# Patient Record
Sex: MALE | Race: WHITE | Employment: OTHER | URBAN - METROPOLITAN AREA
[De-identification: names, ages, dates, MRNs, and addresses within clinical notes are randomized per-mention and may not be internally consistent; named-entity substitution may affect disease eponyms.]

---

## 2021-03-05 ENCOUNTER — IMMUNIZATION (OUTPATIENT)
Dept: PRIMARY CARE CLINIC | Age: 66
End: 2021-03-05
Payer: MEDICARE

## 2021-03-05 PROCEDURE — 0031A COVID-19, J&J VACCINE, PF, 0.5 ML DOSE: CPT | Performed by: FAMILY MEDICINE

## 2021-03-05 PROCEDURE — 91303 COVID-19, J&J VACCINE, PF, 0.5 ML DOSE: CPT | Performed by: FAMILY MEDICINE

## 2022-08-25 ENCOUNTER — OFFICE VISIT (OUTPATIENT)
Dept: SPORTS MEDICINE | Age: 67
End: 2022-08-25
Payer: MEDICARE

## 2022-08-25 VITALS
WEIGHT: 205 LBS | TEMPERATURE: 97.1 F | SYSTOLIC BLOOD PRESSURE: 138 MMHG | BODY MASS INDEX: 29.35 KG/M2 | HEIGHT: 70 IN | DIASTOLIC BLOOD PRESSURE: 82 MMHG

## 2022-08-25 DIAGNOSIS — Z98.890 HX OF LUMBOSACRAL SPINE SURGERY: ICD-10-CM

## 2022-08-25 DIAGNOSIS — M47.26 OSTEOARTHRITIS OF SPINE WITH RADICULOPATHY, LUMBAR REGION: ICD-10-CM

## 2022-08-25 DIAGNOSIS — M51.16 HERNIATION OF LUMBAR INTERVERTEBRAL DISC WITH RADICULOPATHY: Primary | ICD-10-CM

## 2022-08-25 PROCEDURE — G8417 CALC BMI ABV UP PARAM F/U: HCPCS | Performed by: FAMILY MEDICINE

## 2022-08-25 PROCEDURE — 1123F ACP DISCUSS/DSCN MKR DOCD: CPT | Performed by: FAMILY MEDICINE

## 2022-08-25 PROCEDURE — 99204 OFFICE O/P NEW MOD 45 MIN: CPT | Performed by: FAMILY MEDICINE

## 2022-08-25 PROCEDURE — 1036F TOBACCO NON-USER: CPT | Performed by: FAMILY MEDICINE

## 2022-08-25 PROCEDURE — 3017F COLORECTAL CA SCREEN DOC REV: CPT | Performed by: FAMILY MEDICINE

## 2022-08-25 PROCEDURE — G8427 DOCREV CUR MEDS BY ELIG CLIN: HCPCS | Performed by: FAMILY MEDICINE

## 2022-08-25 RX ORDER — MELOXICAM 15 MG/1
15 TABLET ORAL DAILY
Qty: 30 TABLET | Refills: 0 | Status: SHIPPED | OUTPATIENT
Start: 2022-08-25 | End: 2022-09-27 | Stop reason: SDUPTHER

## 2022-08-25 RX ORDER — GABAPENTIN 300 MG/1
300 CAPSULE ORAL NIGHTLY
Qty: 30 CAPSULE | Refills: 0 | Status: SHIPPED | OUTPATIENT
Start: 2022-08-25 | End: 2022-09-24

## 2022-08-25 ASSESSMENT — ENCOUNTER SYMPTOMS
ABDOMINAL DISTENTION: 0
CHEST TIGHTNESS: 0
EYE DISCHARGE: 0
SINUS PAIN: 0
FACIAL SWELLING: 0
APNEA: 0

## 2022-08-25 NOTE — PROGRESS NOTES
[facility]) Physicians  Neurosurgery and Pain [facility]  [address], [address] [facility] , [address] [facility], [facility]: [phone]  F: [phone]      Redd Walsh  (1955)    8/25/2022    Subjective:     Redd Walsh is 78 y.o. male who complains today of:    Chief Complaint   Patient presents with    Lower Back Pain     Previous surgery with Dr. Otis Boo - recommends Lumbar MRI       HPI     This patient comes in complaining of approximately a month of low back pain with left radiculopathy which goes down to the toes he states he has been working with his chiropractic physician during that time and has been having manipulation as well as a physical therapy program with exercises he continues to have issues since really started to bother him at nighttime now and is concerned he states that approximately 10 to 15 years ago he had surgery he believes at the L4-5 level but has not had a problem until recently  Allergies:  Bactrim [sulfamethoxazole-trimethoprim] and Ciprofloxacin    Past Medical History:   Diagnosis Date    Diverticulitis, colon 11/2010    GERD (gastroesophageal reflux disease)     GERD (gastroesophageal reflux disease)     Hyperlipidemia      Past Surgical History:   Procedure Laterality Date    BACK SURGERY  022010    disc surgery lower back    COLECTOMY Left 12/20/2012    probably left, not certain    COLON SURGERY      DENTAL SURGERY      ILEOSTOMY OR JEJUNOSTOMY  1/8/2013     Family History   Problem Relation Age of Onset    Heart Attack Mother     High Cholesterol Mother     Cancer Father         prostate    Cancer Brother         throat     Social History     Socioeconomic History    Marital status:      Spouse name: Not on file    Number of children: Not on file    Years of education: Not on file    Highest education level: Not on file   Occupational History    Not on file   Tobacco Use    Smoking status: Former     Types: Cigars     Start date: 1994 Quit date: 0     Years since quittin.6    Smokeless tobacco: Never   Substance and Sexual Activity    Alcohol use: Yes    Drug use: No    Sexual activity: Yes   Other Topics Concern    Not on file   Social History Narrative    Not on file     Social Determinants of Health     Financial Resource Strain: Not on file   Food Insecurity: Not on file   Transportation Needs: Not on file   Physical Activity: Not on file   Stress: Not on file   Social Connections: Not on file   Intimate Partner Violence: Not on file   Housing Stability: Not on file       Current Outpatient Medications on File Prior to Visit   Medication Sig Dispense Refill    omeprazole (PRILOSEC) 10 MG capsule Take 10 mg by mouth daily. simvastatin (ZOCOR) 20 MG tablet Take 20 mg by mouth nightly. oxyCODONE-acetaminophen (PERCOCET) 5-325 MG per tablet Take 1 tablet by mouth every 4 hours as needed. (Patient not taking: Reported on 2022)       No current facility-administered medications on file prior to visit. Review of Systems   Constitutional:  Negative for activity change and fever. HENT:  Negative for congestion, facial swelling and sinus pain. Eyes:  Negative for discharge. Respiratory:  Negative for apnea and chest tightness. Gastrointestinal:  Negative for abdominal distention. Endocrine: Negative for cold intolerance. Genitourinary:  Negative for difficulty urinating and dysuria. Allergic/Immunologic: Negative for immunocompromised state. Neurological:  Negative for dizziness. Hematological:  Negative for adenopathy. Psychiatric/Behavioral:  Negative for agitation, behavioral problems and confusion. Objective:     Vitals:  /82 (Site: Left Upper Arm, Position: Sitting, Cuff Size: Large Adult)   Temp 97.1 °F (36.2 °C) (Infrared)   Ht 5' 10\" (1.778 m)   Wt 205 lb (93 kg)   BMI 29.41 kg/m² Pain Score:   7      Physical Exam  Constitutional:       Appearance: Normal appearance. XR LUMBAR SPINE (2-3 VIEWS)     Standing Status:   Future     Standing Expiration Date:   8/25/2023    MRI LUMBAR SPINE W WO CONTRAST     Standing Status:   Future     Standing Expiration Date:   11/23/2022     Order Specific Question:   STAT Creatinine as needed:     Answer:   Yes         Follow up:  Return for mri results.     NIRAJ ARIAS DO

## 2022-08-31 ENCOUNTER — HOSPITAL ENCOUNTER (OUTPATIENT)
Dept: MRI IMAGING | Age: 67
Discharge: HOME | End: 2022-09-02
Payer: MEDICARE

## 2022-08-31 DIAGNOSIS — M51.16 HERNIATION OF LUMBAR INTERVERTEBRAL DISC WITH RADICULOPATHY: ICD-10-CM

## 2022-08-31 PROCEDURE — A9579 GAD-BASE MR CONTRAST NOS,1ML: HCPCS | Performed by: FAMILY MEDICINE

## 2022-08-31 PROCEDURE — 72158 MRI LUMBAR SPINE W/O & W/DYE: CPT

## 2022-08-31 PROCEDURE — 6360000004 HC RX CONTRAST MEDICATION: Performed by: FAMILY MEDICINE

## 2022-08-31 RX ORDER — SODIUM CHLORIDE 0.9 % (FLUSH) 0.9 %
10 SYRINGE (ML) INJECTION PRN
Status: DISCONTINUED | OUTPATIENT
Start: 2022-08-31 | End: 2022-09-03 | Stop reason: HOSPADM

## 2022-08-31 RX ADMIN — GADOTERIDOL 20 ML: 279.3 INJECTION, SOLUTION INTRAVENOUS at 11:51

## 2022-09-12 ENCOUNTER — OFFICE VISIT (OUTPATIENT)
Dept: SPORTS MEDICINE | Age: 67
End: 2022-09-12
Payer: MEDICARE

## 2022-09-12 VITALS — TEMPERATURE: 96.1 F | BODY MASS INDEX: 29.35 KG/M2 | WEIGHT: 205 LBS | HEIGHT: 70 IN

## 2022-09-12 DIAGNOSIS — M51.16 HERNIATION OF LUMBAR INTERVERTEBRAL DISC WITH RADICULOPATHY: Primary | ICD-10-CM

## 2022-09-12 PROCEDURE — 1036F TOBACCO NON-USER: CPT | Performed by: FAMILY MEDICINE

## 2022-09-12 PROCEDURE — G8427 DOCREV CUR MEDS BY ELIG CLIN: HCPCS | Performed by: FAMILY MEDICINE

## 2022-09-12 PROCEDURE — G8417 CALC BMI ABV UP PARAM F/U: HCPCS | Performed by: FAMILY MEDICINE

## 2022-09-12 PROCEDURE — 1123F ACP DISCUSS/DSCN MKR DOCD: CPT | Performed by: FAMILY MEDICINE

## 2022-09-12 PROCEDURE — 3017F COLORECTAL CA SCREEN DOC REV: CPT | Performed by: FAMILY MEDICINE

## 2022-09-12 PROCEDURE — 99213 OFFICE O/P EST LOW 20 MIN: CPT | Performed by: FAMILY MEDICINE

## 2022-09-12 RX ORDER — ATORVASTATIN CALCIUM 20 MG/1
TABLET, FILM COATED ORAL
COMMUNITY
Start: 2022-07-15

## 2022-09-12 ASSESSMENT — ENCOUNTER SYMPTOMS
APNEA: 0
SINUS PAIN: 0
CHEST TIGHTNESS: 0
ABDOMINAL DISTENTION: 0
FACIAL SWELLING: 0
EYE DISCHARGE: 0

## 2022-09-12 NOTE — PROGRESS NOTES
[facility]) Physicians  Neurosurgery and Pain [facility]  [address], [address] [facility] , [address] [facility], [place]: [phone]  F: [phone]      Jamilah Laguerre  (1955)    2022    Subjective:     Jamilah Laguerre is 78 y.o. male who complains today of:    Chief Complaint   Patient presents with    Follow-up     Review Lumbar MRI Results       HPI     Patient comes in stating that he still having pain down his left leg gabapentin has not helped as he is at home TENS unit helps a little bit but is still taking all the pain away he is here for reviewing of his MRI  Allergies:  Bactrim [sulfamethoxazole-trimethoprim] and Ciprofloxacin    Past Medical History:   Diagnosis Date    Diverticulitis, colon 2010    GERD (gastroesophageal reflux disease)     GERD (gastroesophageal reflux disease)     Hyperlipidemia      Past Surgical History:   Procedure Laterality Date    BACK SURGERY  046331    disc surgery lower back    COLECTOMY Left 2012    probably left, not certain    COLON SURGERY      DENTAL SURGERY      ILEOSTOMY OR JEJUNOSTOMY  2013     Family History   Problem Relation Age of Onset    Heart Attack Mother     High Cholesterol Mother     Arthritis Mother     Arthritis Father     Cancer Father         prostate    Stroke Father     Cancer Brother         throat     Social History     Socioeconomic History    Marital status:      Spouse name: Not on file    Number of children: Not on file    Years of education: Not on file    Highest education level: Not on file   Occupational History    Not on file   Tobacco Use    Smoking status: Former     Types: Cigars     Start date:      Quit date:      Years since quittin.7    Smokeless tobacco: Never   Substance and Sexual Activity    Alcohol use:  Yes     Alcohol/week: 20.0 standard drinks     Types: 20 Cans of beer per week    Drug use: No    Sexual activity: Yes   Other Topics Concern    Not on file Social History Narrative    Not on file     Social Determinants of Health     Financial Resource Strain: Not on file   Food Insecurity: Not on file   Transportation Needs: Not on file   Physical Activity: Not on file   Stress: Not on file   Social Connections: Not on file   Intimate Partner Violence: Not on file   Housing Stability: Not on file       Current Outpatient Medications on File Prior to Visit   Medication Sig Dispense Refill    atorvastatin (LIPITOR) 20 MG tablet Take 1 tablet by mouth once daily. gabapentin (NEURONTIN) 300 MG capsule Take 1 capsule by mouth nightly for 30 days. 30 capsule 0    meloxicam (MOBIC) 15 MG tablet Take 1 tablet by mouth daily 30 tablet 0    oxyCODONE-acetaminophen (PERCOCET) 5-325 MG per tablet Take 1 tablet by mouth every 4 hours as needed. omeprazole (PRILOSEC) 10 MG capsule Take 10 mg by mouth daily. simvastatin (ZOCOR) 20 MG tablet Take 20 mg by mouth nightly. No current facility-administered medications on file prior to visit. Review of Systems   Constitutional:  Negative for activity change and fever. HENT:  Negative for congestion, facial swelling and sinus pain. Eyes:  Negative for discharge. Respiratory:  Negative for apnea and chest tightness. Gastrointestinal:  Negative for abdominal distention. Endocrine: Negative for cold intolerance. Genitourinary:  Negative for difficulty urinating and dysuria. Allergic/Immunologic: Negative for immunocompromised state. Neurological:  Negative for dizziness. Hematological:  Negative for adenopathy. Psychiatric/Behavioral:  Negative for agitation, behavioral problems and confusion. Objective:     Vitals:  Temp (!) 96.1 °F (35.6 °C) (Infrared)   Ht 5' 10\" (1.778 m)   Wt 205 lb (93 kg)   BMI 29.41 kg/m² Pain Score:   5      Physical Exam  Constitutional:       Appearance: Normal appearance. Skin:     General: Skin is warm and dry.    Neurological:      Mental Status: He is alert. Ortho Exam   Examination of the lumbar spine revealed the neurovascular muscular status to be intact positive tension signs on the left patient has near full range of motion mild palpable tenderness over the left sciatic notch    Reviewed the recent MRI of the lumbar spine with the patient showing the L4-5 disc herniation    Assessment:      Diagnosis Orders   1. Herniation of lumbar intervertebral disc with radiculopathy            Plan:   Patient is failed conservative care when to send him for pain management for consideration for epidural blocks if this fails to quell his symptoms and within the center for surgical evaluation       No orders of the defined types were placed in this encounter. No orders of the defined types were placed in this encounter. Follow up:  Return for Pain management.     NIRAJ ARIAS DO

## 2022-09-27 ENCOUNTER — INITIAL CONSULT (OUTPATIENT)
Dept: PAIN MANAGEMENT | Age: 67
End: 2022-09-27
Payer: MEDICARE

## 2022-09-27 VITALS
DIASTOLIC BLOOD PRESSURE: 86 MMHG | HEART RATE: 73 BPM | SYSTOLIC BLOOD PRESSURE: 134 MMHG | HEIGHT: 70 IN | OXYGEN SATURATION: 97 % | BODY MASS INDEX: 29.35 KG/M2 | TEMPERATURE: 97.8 F | WEIGHT: 205 LBS

## 2022-09-27 DIAGNOSIS — M51.16 HERNIATION OF LUMBAR INTERVERTEBRAL DISC WITH RADICULOPATHY: ICD-10-CM

## 2022-09-27 DIAGNOSIS — M96.1 LUMBAR POST-LAMINECTOMY SYNDROME: ICD-10-CM

## 2022-09-27 DIAGNOSIS — M54.16 LUMBAR RADICULOPATHY: Primary | ICD-10-CM

## 2022-09-27 PROCEDURE — 3017F COLORECTAL CA SCREEN DOC REV: CPT | Performed by: PAIN MEDICINE

## 2022-09-27 PROCEDURE — G8427 DOCREV CUR MEDS BY ELIG CLIN: HCPCS | Performed by: PAIN MEDICINE

## 2022-09-27 PROCEDURE — 99203 OFFICE O/P NEW LOW 30 MIN: CPT | Performed by: PAIN MEDICINE

## 2022-09-27 PROCEDURE — G8417 CALC BMI ABV UP PARAM F/U: HCPCS | Performed by: PAIN MEDICINE

## 2022-09-27 PROCEDURE — 1123F ACP DISCUSS/DSCN MKR DOCD: CPT | Performed by: PAIN MEDICINE

## 2022-09-27 PROCEDURE — 1036F TOBACCO NON-USER: CPT | Performed by: PAIN MEDICINE

## 2022-09-27 RX ORDER — MELOXICAM 15 MG/1
15 TABLET ORAL DAILY
Qty: 30 TABLET | Refills: 0 | Status: SHIPPED | OUTPATIENT
Start: 2022-09-27

## 2022-09-27 ASSESSMENT — ENCOUNTER SYMPTOMS
EYES NEGATIVE: 1
RESPIRATORY NEGATIVE: 1
ALLERGIC/IMMUNOLOGIC NEGATIVE: 1
GASTROINTESTINAL NEGATIVE: 1

## 2022-09-27 NOTE — PROGRESS NOTES
History of Present Illness     Patient Identification  Rolando Butler is a 78 y.o. male. Patient information was obtained from patient and spouse/SO. Chief Complaint   Chief Complaint   Patient presents with    Back Pain     This patient comes in complaining of approximately 2 months of low back pain with left radiculopathy which goes down to the toes he states he has been working with his chiropractic physician during that time and has been having manipulation as well as a physical therapy program with exercises he continues to have issues since really started to bother him at nighttime now and is concerned he states that approximately 10 to 15 years ago he had surgery he believes at the L4-5 level but has not had a problem until recently  His pain is better than 2 months ago but still hurts a lot. No loss of bowel or Bladder controll. No weakness. MRI L/S L2/3: 8/25/2022:. Disc space narrowing. Mild disc bulging. Facet hypertrophy and thickening of ligamenta flava. Increasing epidural fat. Resulting moderate central canal stenosis. Minimal foraminal narrowing bilaterally. L3/4:  Broad-based disc bulging. Facet hypertrophy with increased epidural fat. Moderate central canal narrowing. Mild right foraminal narrowing. L4/5:  Disc space narrowing with asymmetric disc osteophyte complex at the left subarticular zone with contact on the traversing left L5 nerve root. Mild central canal narrowing. Mild bilateral foraminal narrowing. L5/S1: Tiny central disc protrusion with associated annular fissure. No central canal stenosis. No foraminal narrowing.         Past Medical History:   Diagnosis Date    Diverticulitis, colon 11/2010    GERD (gastroesophageal reflux disease)     GERD (gastroesophageal reflux disease)     Hyperlipidemia      Family History   Problem Relation Age of Onset    Heart Attack Mother     High Cholesterol Mother     Arthritis Mother     Arthritis Father     Cancer Father         prostate    Stroke Father     Cancer Brother         throat     Current Outpatient Medications   Medication Sig Dispense Refill    atorvastatin (LIPITOR) 20 MG tablet Take 1 tablet by mouth once daily. gabapentin (NEURONTIN) 300 MG capsule Take 1 capsule by mouth nightly for 30 days. 30 capsule 0    meloxicam (MOBIC) 15 MG tablet Take 1 tablet by mouth daily 30 tablet 0    oxyCODONE-acetaminophen (PERCOCET) 5-325 MG per tablet Take 1 tablet by mouth every 4 hours as needed. omeprazole (PRILOSEC) 10 MG capsule Take 10 mg by mouth daily. simvastatin (ZOCOR) 20 MG tablet Take 20 mg by mouth nightly. No current facility-administered medications for this visit. Allergies   Allergen Reactions    Bactrim [Sulfamethoxazole-Trimethoprim]     Ciprofloxacin      Possible allergy       Review of Systems  Review of Systems   Constitutional: Negative. HENT: Negative. Eyes: Negative. Respiratory: Negative. Cardiovascular: Negative. Gastrointestinal: Negative. Endocrine: Negative. Genitourinary: Negative. Musculoskeletal: Negative. Skin: Negative. Allergic/Immunologic: Negative. Neurological: Negative. Hematological: Negative. Psychiatric/Behavioral: Negative. All other systems reviewed and are negative. Physical Exam     /86   Pulse 73   Temp 97.8 °F (36.6 °C)   Ht 5' 10\" (1.778 m)   Wt 205 lb (93 kg)   SpO2 97%   BMI 29.41 kg/m²   Physical Exam  Vitals and nursing note reviewed. Constitutional:       Appearance: Normal appearance. HENT:      Head: Normocephalic. Right Ear: Ear canal normal.      Left Ear: Ear canal normal.      Nose: Nose normal.      Mouth/Throat:      Mouth: Mucous membranes are moist.   Eyes:      Extraocular Movements: Extraocular movements intact. Conjunctiva/sclera: Conjunctivae normal.      Pupils: Pupils are equal, round, and reactive to light.    Cardiovascular:      Rate and Rhythm: Normal rate and regular rhythm. Pulses: Normal pulses. Heart sounds: Normal heart sounds. Pulmonary:      Effort: Pulmonary effort is normal.      Breath sounds: Normal breath sounds. Abdominal:      General: Abdomen is flat. Bowel sounds are normal.      Palpations: Abdomen is soft. Musculoskeletal:         General: Normal range of motion. Cervical back: Normal range of motion and neck supple. Comments: Good Gait, able to walk on Toes and heels, Good ROM Lumbar spine, No tenderness, Moderate pain on SLRs both sides. No pain on Hip grinding, no pain on Gainslins. Skin:     General: Skin is warm. Capillary Refill: Capillary refill takes less than 2 seconds. Neurological:      General: No focal deficit present. Mental Status: He is alert and oriented to person, place, and time. Mental status is at baseline. Psychiatric:         Mood and Affect: Mood normal.         Behavior: Behavior normal.         Thought Content: Thought content normal.         Judgment: Judgment normal.         Plan   This patient comes in complaining of approximately 2 months of low back pain with left radiculopathy which goes down to the toes he states he has been working with his chiropractic physician during that time and has been having manipulation as well as a physical therapy program with exercises he continues to have issues since really started to bother him at nighttime now and is concerned he states that approximately 10 to 15 years ago he had surgery he believes at the L4-5 level but has not had a problem until recently  His pain is better than 2 months ago but still hurts a lot. No loss of bowel or Bladder controll. No weakness. As his pain is getting better advised to wait and watch and follow as needed.